# Patient Record
Sex: FEMALE | Race: WHITE | NOT HISPANIC OR LATINO | Employment: OTHER | URBAN - NONMETROPOLITAN AREA
[De-identification: names, ages, dates, MRNs, and addresses within clinical notes are randomized per-mention and may not be internally consistent; named-entity substitution may affect disease eponyms.]

---

## 2017-02-16 ENCOUNTER — OFFICE VISIT (OUTPATIENT)
Dept: OPHTHALMOLOGY | Facility: CLINIC | Age: 62
End: 2017-02-16

## 2017-02-16 DIAGNOSIS — E11.9 DIABETES MELLITUS WITHOUT COMPLICATION (HCC): ICD-10-CM

## 2017-02-16 DIAGNOSIS — H35.30 MACULAR DEGENERATION: ICD-10-CM

## 2017-02-16 DIAGNOSIS — H26.9 CATARACT: Primary | ICD-10-CM

## 2017-02-16 PROCEDURE — 99213 OFFICE O/P EST LOW 20 MIN: CPT | Performed by: OPHTHALMOLOGY

## 2017-02-16 RX ORDER — LISINOPRIL AND HYDROCHLOROTHIAZIDE 25; 20 MG/1; MG/1
TABLET ORAL
Refills: 5 | COMMUNITY
Start: 2017-02-02

## 2017-02-16 NOTE — PROGRESS NOTES
Subjective   Kaylee Elliott is a 61 y.o. female.   Chief Complaint   Patient presents with   • Diabetic Eye Exam   • Macular Degeneration     mild   • Cataract       HPI     Diabetic Eye Exam   Vision is stable.  Associated symptoms include blurred vision.  Diabetes characteristics include Type 2.  Duration of years.  Blood sugar level fluctuates.           Macular Degeneration    Additional comments: mild           Cataract   In both eyes.  Associated symptoms include blurred vision.  Severity is moderate.  Onset was gradual.  Duration of years.  Frequency is constant.  Since onset it is gradually worsening.       Last edited by Gamal Garay MD on 2/16/2017 11:02 AM. (History)          Review of Systems   Eyes: Positive for visual disturbance. Negative for pain.       Objective   Visual Acuity (Snellen - Linear)      Right Left   Dist cc 20/30 -1 20/25 -2       Correction:  Glasses         Manifest Refraction      Sphere Cylinder Axis Add   Right -0.50 +0.75 170 +2.25   Left -0.50 +0.75 180 +2.25       Comments:  Va 20/30 ou            Pupils      Pupils   Right PERRL   Left PERRL           Confrontational Visual Fields     Visual Fields      Left Right   Result Full Full                  Extraocular Movement      Right Left   Result Full, Ortho Full, Ortho              Tonometry (Applanation, 11:09 AM)      Right Left   Pressure 13 13              Main Ophthalmology Exam     External Exam      Right Left    External Normal Normal      Slit Lamp Exam      Right Left    Lids/Lashes Normal Normal    Conjunctiva/Sclera White and quiet White and quiet    Cornea Clear Clear    Anterior Chamber Deep and quiet Deep and quiet    Iris Round and reactive Round and reactive    Lens 1+ Nuclear sclerosis 1+ Nuclear sclerosis    Vitreous Normal Normal      Fundus Exam      Right Left    Disc Normal Normal    Macula Drusen, Soft drusen, Age related macular degeneration, Early age related macular degeneration Drusen,  Soft drusen, Age related macular degeneration, Early age related macular degeneration    Vessels Normal Normal    Periphery Normal Normal    No BDR                Assessment/Plan   Diagnoses and all orders for this visit:    Cataract    Diabetes mellitus without complication    Macular degeneration                Return in about 1 year (around 2/16/2018) for Dilated exam.

## 2017-05-05 ENCOUNTER — OFFICE VISIT (OUTPATIENT)
Dept: OBSTETRICS AND GYNECOLOGY | Facility: CLINIC | Age: 62
End: 2017-05-05

## 2017-05-05 VITALS
BODY MASS INDEX: 31.67 KG/M2 | DIASTOLIC BLOOD PRESSURE: 80 MMHG | WEIGHT: 209 LBS | HEIGHT: 68 IN | SYSTOLIC BLOOD PRESSURE: 140 MMHG

## 2017-05-05 DIAGNOSIS — R87.610 ATYPICAL SQUAMOUS CELL CHANGES OF UNDETERMINED SIGNIFICANCE (ASCUS) ON CERVICAL CYTOLOGY WITH NEGATIVE HIGH RISK HUMAN PAPILLOMA VIRUS (HPV) TEST RESULT: Primary | ICD-10-CM

## 2017-05-05 PROCEDURE — 99213 OFFICE O/P EST LOW 20 MIN: CPT | Performed by: OBSTETRICS & GYNECOLOGY

## 2020-03-05 ENCOUNTER — TELEPHONE (OUTPATIENT)
Dept: FAMILY MEDICINE CLINIC | Facility: CLINIC | Age: 65
End: 2020-03-05

## 2021-11-29 ENCOUNTER — TRANSCRIBE ORDERS (OUTPATIENT)
Dept: PHYSICAL THERAPY | Facility: HOSPITAL | Age: 66
End: 2021-11-29

## 2021-11-29 DIAGNOSIS — M25.562 LEFT KNEE PAIN, UNSPECIFIED CHRONICITY: Primary | ICD-10-CM

## 2021-12-06 ENCOUNTER — HOSPITAL ENCOUNTER (OUTPATIENT)
Dept: PHYSICAL THERAPY | Facility: HOSPITAL | Age: 66
Setting detail: THERAPIES SERIES
Discharge: HOME | End: 2021-12-06

## 2021-12-06 DIAGNOSIS — M25.562 PAIN IN LATERAL PORTION OF LEFT KNEE: Primary | ICD-10-CM

## 2021-12-06 PROCEDURE — 97162 PT EVAL MOD COMPLEX 30 MIN: CPT | Performed by: PHYSICAL THERAPIST

## 2021-12-06 PROCEDURE — 97110 THERAPEUTIC EXERCISES: CPT | Performed by: PHYSICAL THERAPIST

## 2021-12-06 NOTE — THERAPY EVALUATION
Outpatient Physical Therapy Ortho Initial Evaluation  [facility]     Patient Name: Kaylee Elliott  : 1955  MRN: [id number]  Today's Date: 2021      Visit Date: 2021  Visit   Return to MD: RENETTA  Re-cert date: 21  Patient Active Problem List   Diagnosis   • Cataract   • Diabetes mellitus without complication (HCC)   • Macular degeneration        Past Medical History:   Diagnosis Date   • Abnormal Pap smear of cervix    • Acute maxillary sinusitis    • Arthritis    • Atrial fibrillation (HCC)    • Degeneration of macula and posterior pole of retina    • Diabetic neuropathy (HCC)    • Nuclear senile cataract    • Pain in lower limb    • Type 2 diabetes mellitus (HCC)     no bdr        Past Surgical History:   Procedure Laterality Date   • CHOLECYSTECTOMY     • INJECTION OF MEDICATION  2015    Kenalog1)   • TUBAL ABDOMINAL LIGATION         Visit Dx:     ICD-10-CM ICD-9-CM   1. Pain in lateral portion of left knee  M25.562 719.46     Medications (Admitted on 2021)       cetirizine (zyrTEC) 10 MG tablet    lisinopril-hydrochlorothiazide (PRINZIDE,ZESTORETIC) 20-25 MG per tablet    metFORMIN (GLUCOPHAGE) 1000 MG tablet    ONE TOUCH ULTRA TEST test strip    SITagliptin (JANUVIA) 100 MG tablet    Allergies: NKA     PT Ortho     Row Name 21 0800       Subjective Comments    Subjective Comments 65 yo female with 2 yr h/o progressively worsening L knee pain. X-ray evidence of L knee films showing advanced, near bone on bone narrowing of the medial compartment with bone spurring in the patellofemoral compartment. Aggravating factors: climbing steps: down>up, extensive walking, squatting. Easing factors: keeping LE elevated, blue emu cream  -BS       Precautions and Contraindications    Precautions/Limitations --  -BS    Precautions h/o afib  -BS       Subjective Pain    Able to rate subjective pain? yes  -BS    Pre-Treatment Pain Level 4  -BS    Post-Treatment Pain Level  3  -BS    Subjective Pain Comment 4-10/10 L knee  -BS       Posture/Observations    Posture/Observations Comments wearing hinged knee brace into the clinic  -BS       General ROM    GENERAL ROM COMMENTS AROM: R knee 0-120 L knee 0-115  -BS       MMT (Manual Muscle Testing)    General MMT Comments MMT:R LE 5/5 L LE 5/5  -BS       Sensation    Light Touch No apparent deficits  -BS    Additional Comments intact fine motor coordination B LE's  -BS       Flexibility    Flexibility Tested? Lower Extremity  -BS       Lower Extremity Flexibility    Hamstrings Right:; WNL; Left:; Mildly limited  -BS    Hip Flexors Bilateral:; WNL  -BS    Quadriceps Left:; Mildly limited; Right:; Moderately limited  -BS       Balance Skills Training    SLS 2 sec on L, 2 sec, 3 sec, 4 sec on R  -BS          User Key  (r) = Recorded By, (t) = Taken By, (c) = Cosigned By    Initials Name Provider Type    Ugo Stinson, PT Physical Therapist                                   PT OP Goals     Row Name 12/06/21 0851 12/06/21 0800       PT Short Term Goals    STG Date to Achieve 12/27/21  -BS --    STG 1 Patient independent with HEP  -BS --    STG 1 Progress New  -BS --    STG 2 Improve L knee flexion AROM to 120°  -BS --    STG 2 Progress New  -BS --    STG 3 Improve L quadriceps/L hamstrings flexibility to WFL  -BS --    STG 3 Progress New  -BS --    STG 4 Improve L SLS to >/=10 sec consistently  -BS --    STG 4 Progress New  -BS --    STG 5 Able to go up/down 5 stairs in the stairwell with use of handrail with minimal to no L knee pain  -BS --    STG 5 Progress New  -BS --    STG 6 Improve 30 sec sit to stand test to 13 reps without UE A  -BS --    STG 6 Progress New  -BS --       Time Calculation    PT Goal Re-Cert Due Date 12/27/21  -BS --  -BS          User Key  (r) = Recorded By, (t) = Taken By, (c) = Cosigned By    Initials Name Provider Type    Ugo Stinson, PT Physical Therapist                 PT Assessment/Plan     Row Name  12/06/21 0851          PT Assessment    Functional Limitations Performance in leisure activities; Performance in work activities; Limitation in home management  -BS     Impairments Balance; Impaired flexibility; Gait; Pain; Range of motion; Sensation  -BS     Assessment Comments 65 yo female with chronic L knee pain due to advanced L knee osteoarthritis.  -BS     Please refer to paper survey for additional self-reported information Yes  -BS     Rehab Potential Good  -BS     Patient/caregiver participated in establishment of treatment plan and goals Yes  -BS     Patient would benefit from skilled therapy intervention Yes  -BS            PT Plan    PT Frequency 2x/week  -BS     Predicted Duration of Therapy Intervention (PT) 3 weeks then TBD  -BS     Planned CPT's? PT EVAL MOD COMPLELITY: 59966; PT RE-EVAL: 59421; PT THER PROC EA 15 MIN: 88434; PT THER ACT EA 15 MIN: 53557; PT MANUAL THERAPY EA 15 MIN: 01837; PT NEUROMUSC RE-EDUCATION EA 15 MIN: 61597; PT HOT OR COLD PACK TREAT MCARE; PT ELECTRICAL STIM UNATTEND: ; PT THER SUPP EA 15 MIN  -BS     Physical Therapy Interventions (Optional Details) balance training; home exercise program; joint mobilization; manual therapy techniques; modalities; neuromuscular re-education; patient/family education; ROM (Range of Motion); stair training; strengthening; transfer training  -BS     PT Plan Comments push L knee flexibility and standing balance. SLS on Airex, HR/TR on Airex. Standing HS S and prone quad S w/ strap.  -BS           User Key  (r) = Recorded By, (t) = Taken By, (c) = Cosigned By    Initials Name Provider Type    Ugo Stinson PT Physical Therapist                   OP Exercises     Row Name 12/06/21 0800             Subjective Comments    Subjective Comments 65 yo female with 2 yr h/o progressively worsening L knee pain. X-ray evidence of L knee films showing advanced, near bone on bone narrowing of the medial compartment with bone spurring in the  "patellofemoral compartment. Aggravating factors: climbing steps: down>up, extensive walking, squatting. Easing factors: keeping LE elevated, blue emu cream  -BS              Subjective Pain    Able to rate subjective pain? yes  -BS      Pre-Treatment Pain Level 4  -BS      Post-Treatment Pain Level 3  -BS      Subjective Pain Comment 4-10/10 L knee  -BS              Exercise 1    Exercise Name 1 SL quad/hip flex S w/ towel  -BS      Sets 1 1  -BS      Reps 1 2  -BS      Time 1 30\" hold  -BS              Exercise 2    Exercise Name 2 seated B HS S  -BS      Sets 2 1  -BS      Reps 2 1  -BS      Time 2 30\" hold  -BS              Exercise 3    Exercise Name 3 30 sec STS test, no UE A  -BS      Reps 3 10 reps  -BS              Exercise 4    Exercise Name 4 seated heel slides, L  -BS      Sets 4 1  -BS      Reps 4 15  -BS            User Key  (r) = Recorded By, (t) = Taken By, (c) = Cosigned By    Initials Name Provider Type    Ugo Stinson, PT Physical Therapist                              Outcome Measure Options: Lower Extremity Functional Scale (LEFS), 30 Second Chair Stand Test  30 Second Chair Stand Test  30 Second Chair Stand Test: 10 reps w/ no UE A  Lower Extremity Functional Index  Any of your usual work, housework or school activities: Moderate difficulty  Your usual hobbies, recreational or sporting activities: Moderate difficulty  Getting into or out of the bath: No difficulty  Walking between rooms: No difficulty  Putting on your shoes or socks: No difficulty  Squatting: Quite a bit of difficulty  Lifting an object, like a bag of groceries from the floor: Moderate difficulty  Performing light activities around your home: Moderate difficulty  Performing heavy activities around your home: Moderate difficulty  Getting into or out of a car: No difficulty  Walking 2 blocks: Quite a bit of difficulty  Walking a mile: Extreme difficulty or unable to perform activity  Going up or down 10 stairs (about 1 flight " of stairs): Extreme difficulty or unable to perform activity  Standing for 1 hour: Extreme difficulty or unable to perform activity  Sitting for 1 hour: No difficulty  Running on even ground: Extreme difficulty or unable to perform activity  Running on uneven ground: Extreme difficulty or unable to perform activity  Making sharp turns while running fast: Extreme difficulty or unable to perform activity  Hopping: Extreme difficulty or unable to perform activity  Rolling over in bed: No difficulty  Total: 36      Time Calculation:     Start Time: 0851  Stop Time: 0939  Time Calculation (min): 48 min  Total Timed Code Minutes- PT: 48 minute(s)     Therapy Charges for Today     Code Description Service Date Service Provider Modifiers Qty    90179490260 HC PT EVAL MOD COMPLEXITY 3 12/6/2021 Ugo Lama, PT GP 1    47035358487 HC PT THER PROC EA 15 MIN 12/6/2021 Ugo Lama, PT GP 1          PT G-Codes  Outcome Measure Options: Lower Extremity Functional Scale (LEFS), 30 Second Chair Stand Test  Total: 36         Ugo Lama PT  12/6/2021

## 2021-12-13 ENCOUNTER — APPOINTMENT (OUTPATIENT)
Dept: PHYSICAL THERAPY | Facility: HOSPITAL | Age: 66
End: 2021-12-13

## 2021-12-17 ENCOUNTER — APPOINTMENT (OUTPATIENT)
Dept: PHYSICAL THERAPY | Facility: HOSPITAL | Age: 66
End: 2021-12-17

## 2021-12-20 ENCOUNTER — HOSPITAL ENCOUNTER (OUTPATIENT)
Dept: PHYSICAL THERAPY | Facility: HOSPITAL | Age: 66
Setting detail: THERAPIES SERIES
Discharge: HOME | End: 2021-12-20

## 2021-12-20 DIAGNOSIS — M25.562 PAIN IN LATERAL PORTION OF LEFT KNEE: Primary | ICD-10-CM

## 2021-12-20 PROCEDURE — 97110 THERAPEUTIC EXERCISES: CPT

## 2021-12-20 NOTE — THERAPY TREATMENT NOTE
Outpatient Physical Therapy Ortho Treatment Note  [facility]     Patient Name: Kaylee Elliott  : 1955  MRN: [id number]  Today's Date: 2021      Visit Date: 2021     Subjective Improvement: 0%  Attendance:  2/ (medicare-no maintenance)  Next MD Visit : TBD  Recert Date:  2021      Therapy Diagnosis:  L Knee Pain         Visit Dx:    ICD-10-CM ICD-9-CM   1. Pain in lateral portion of left knee  M25.562 719.46       Patient Active Problem List   Diagnosis   • Cataract   • Diabetes mellitus without complication (HCC)   • Macular degeneration        Past Medical History:   Diagnosis Date   • Abnormal Pap smear of cervix    • Acute maxillary sinusitis    • Arthritis    • Atrial fibrillation (HCC)    • Degeneration of macula and posterior pole of retina    • Diabetic neuropathy (HCC)    • Nuclear senile cataract    • Pain in lower limb    • Type 2 diabetes mellitus (HCC)     no bdr        Past Surgical History:   Procedure Laterality Date   • CHOLECYSTECTOMY     • INJECTION OF MEDICATION  2015    Kenalog1)   • TUBAL ABDOMINAL LIGATION          PT Ortho     Row Name 21 0800       Precautions and Contraindications    Precautions h/o afib  -KH       Subjective Pain    Post-Treatment Pain Level 3  -KH       Posture/Observations    Posture/Observations Comments wearing hinged knee brace on L knee (lateral off loading) genu varus noted B  -KH          User Key  (r) = Recorded By, (t) = Taken By, (c) = Cosigned By    Initials Name Provider Type    Janna Rodriguez PTA Physical Therapy Assistant                             PT Assessment/Plan     Row Name 21 0800          PT Assessment    Functional Limitations Performance in leisure activities; Performance in work activities; Limitation in home management  -     Impairments Balance; Impaired flexibility; Gait; Pain; Range of motion; Sensation  -     Assessment Comments increased patients HEP this date with  "strengthening exercises; did well with all and had decreased pain post treatment; Was given written instruction for all new HEP; progressing towards goals;  -KH     Rehab Potential Good  -KH     Patient/caregiver participated in establishment of treatment plan and goals Yes  -KH     Patient would benefit from skilled therapy intervention Yes  -KH            PT Plan    PT Frequency 2x/week  -KH     Predicted Duration of Therapy Intervention (PT) 3 weeks then TBD  -KH     PT Plan Comments Progress CKC in brace next visit; Airex Balance activties next  -           User Key  (r) = Recorded By, (t) = Taken By, (c) = Cosigned By    Initials Name Provider Type    Janna Rodriguez PTA Physical Therapy Assistant                   OP Exercises     Row Name 12/20/21 0800             Subjective Comments    Subjective Comments Patient reports that she had to babysit all last week secondary to her grandkids being out of school; Knee is hurting this morning; reports that her HEP is going well;  -KH              Subjective Pain    Able to rate subjective pain? yes  -KH      Pre-Treatment Pain Level 6  -KH      Post-Treatment Pain Level 3  -KH              Exercise 1    Exercise Name 1 pro ll LE strength  -KH      Time 1 10 mins  -KH      Additional Comments level 2 ;seat 10  -KH              Exercise 2    Exercise Name 2 HC/Ham stretch with strap  -KH      Sets 2 3  -KH      Time 2 30\" holds  -KH              Exercise 3    Exercise Name 3 quad sets w/ add squeeze  -KH      Sets 3 2  -KH      Reps 3 10  -KH      Time 3 5\" holds  -KH              Exercise 4    Exercise Name 4 SLR-Flexion  -KH      Sets 4 2  -KH      Reps 4 10  -KH              Exercise 5    Exercise Name 5 Clam Shells L  -KH      Sets 5 2  -KH      Reps 5 10  -KH              Exercise 6    Exercise Name 6 Reverse Clam Shells  -KH      Sets 6 2  -KH      Reps 6 10  -KH              Exercise 7    Exercise Name 7 SAQ  -KH      Sets 7 2  -KH      Reps 7 10  -KH        " "      Exercise 8    Exercise Name 8 incline stretch  -KH      Sets 8 3  -KH      Time 8 30\" holds  -KH              Exercise 9    Exercise Name 9 standing hip flexor stretch  -KH      Sets 9 3  -KH      Time 9 30\" holds  -KH            User Key  (r) = Recorded By, (t) = Taken By, (c) = Cosigned By    Initials Name Provider Type    Janna Rodriguez PTA Physical Therapy Assistant                              PT OP Goals     Row Name 12/20/21 0800          PT Short Term Goals    STG Date to Achieve 12/27/21  -KH     STG 1 Patient independent with HEP  -KH     STG 2 Improve L knee flexion AROM to 120°  -KH     STG 3 Improve L quadriceps/L hamstrings flexibility to WFL  -KH     STG 4 Improve L SLS to >/=10 sec consistently  -KH     STG 5 Able to go up/down 5 stairs in the stairwell with use of handrail with minimal to no L knee pain  -KH     STG 6 Improve 30 sec sit to stand test to 13 reps without UE A  -KH            Time Calculation    PT Goal Re-Cert Due Date 12/27/21  -           User Key  (r) = Recorded By, (t) = Taken By, (c) = Cosigned By    Initials Name Provider Type    Janna Rodriguez PTA Physical Therapy Assistant                Therapy Education  Education Details: clam shells, reverse clam shells, SLR flexion, SAQ & quad sets w/ add squeeze  Given: HEP  Program: New  How Provided: Verbal, Demonstration, Written  Provided to: Patient  Level of Understanding: Teach back education performed, Verbalized, Demonstrated              Time Calculation:   Start Time: 0800  Stop Time: 0838  Time Calculation (min): 38 min  Therapy Charges for Today     Code Description Service Date Service Provider Modifiers Qty    40548942668  PT THER PROC EA 15 MIN 12/20/2021 Janna Khan PTA GP 3                    Janna Khan PTA  12/20/2021     "

## 2021-12-22 ENCOUNTER — APPOINTMENT (OUTPATIENT)
Dept: PHYSICAL THERAPY | Facility: HOSPITAL | Age: 66
End: 2021-12-22

## 2021-12-27 ENCOUNTER — APPOINTMENT (OUTPATIENT)
Dept: PHYSICAL THERAPY | Facility: HOSPITAL | Age: 66
End: 2021-12-27

## 2021-12-29 ENCOUNTER — HOSPITAL ENCOUNTER (OUTPATIENT)
Dept: PHYSICAL THERAPY | Facility: HOSPITAL | Age: 66
Setting detail: THERAPIES SERIES
Discharge: HOME | End: 2021-12-29

## 2021-12-29 DIAGNOSIS — M25.562 PAIN IN LATERAL PORTION OF LEFT KNEE: Primary | ICD-10-CM

## 2021-12-29 PROCEDURE — 97110 THERAPEUTIC EXERCISES: CPT | Performed by: PHYSICAL THERAPIST

## 2021-12-29 NOTE — THERAPY PROGRESS REPORT/RE-CERT
Outpatient Physical Therapy Ortho Progress Note  [facility]     Patient Name: Kaylee Elliott  : 1955  MRN: [id number]  Today's Date: 2021      Visit Date: 2021  Subjective Improvement: 0%  Attendance:  3/5 (medicare-no maintenance)  Next MD Visit : TBD  Recert Date:  2022        Therapy Diagnosis:  L Knee Pain   Visit Dx:    ICD-10-CM ICD-9-CM   1. Pain in lateral portion of left knee  M25.562 719.46       Patient Active Problem List   Diagnosis   • Cataract   • Diabetes mellitus without complication (HCC)   • Macular degeneration        Past Medical History:   Diagnosis Date   • Abnormal Pap smear of cervix    • Acute maxillary sinusitis    • Arthritis    • Atrial fibrillation (HCC)    • Degeneration of macula and posterior pole of retina    • Diabetic neuropathy (HCC)    • Nuclear senile cataract    • Pain in lower limb    • Type 2 diabetes mellitus (HCC)     no bdr        Past Surgical History:   Procedure Laterality Date   • CHOLECYSTECTOMY     • INJECTION OF MEDICATION  2015    Kenalog1)   • TUBAL ABDOMINAL LIGATION          PT Ortho     Row Name 21 0800       Precautions and Contraindications    Precautions h/o afib  -BS       Subjective Pain    Post-Treatment Pain Level 2  -BS       Posture/Observations    Posture/Observations Comments wearing hinged knee brace on L knee (lateral off loading) genu varus noted B  -BS       General ROM    GENERAL ROM COMMENTS AROM: L knee 0-129°  -BS       MMT (Manual Muscle Testing)    General MMT Comments MMT: L knee 5/5 (flex/ext) L ankle margret/inv 4+/5 (both)  -BS       Lower Extremity Flexibility    Hamstrings Left:; WNL  L 70°  -BS       Balance Skills Training    SLS SLS 3 sec left  -BS          User Key  (r) = Recorded By, (t) = Taken By, (c) = Cosigned By    Initials Name Provider Type    Ugo Stinson PT Physical Therapist                             PT Assessment/Plan     Row Name 21 0800          PT  "Assessment    Assessment Comments Slow progress toward goals in part due to poor participation with PT over the past cert period. Pt educated on need to commit to 2x/week per plan to maximize functional potential.  -BS     Rehab Potential Good  -BS     Patient/caregiver participated in establishment of treatment plan and goals Yes  -BS     Patient would benefit from skilled therapy intervention Yes  -BS            PT Plan    PT Frequency 2x/week  -BS     Predicted Duration of Therapy Intervention (PT) 3 weeks  -BS     PT Plan Comments continue step ups/downs on 4\" without brace, TKE with TB.  -BS           User Key  (r) = Recorded By, (t) = Taken By, (c) = Cosigned By    Initials Name Provider Type    BS Ugo Lama, PT Physical Therapist                   OP Exercises     Row Name 12/29/21 0800             Subjective Comments    Subjective Comments Patient reports had a sinus infection last week and unable to attend as well due to babysitting her grandchildren this current cert period.  -BS              Subjective Pain    Able to rate subjective pain? yes  -BS      Pre-Treatment Pain Level 2  -BS      Post-Treatment Pain Level 2  -BS              Exercise 1    Exercise Name 1 pro ll LE strength  -BS      Time 1 10 mins  -BS              Exercise 2    Exercise Name 2 standing L HS S  -BS      Sets 2 1  -BS      Reps 2 3  -BS      Time 2 30\" hold  -BS              Exercise 3    Exercise Name 3 30 sec STS test  -BS      Time 3 11 reps w/ no UE A  -BS              Exercise 4    Exercise Name 4 R SLS  -BS      Reps 4 3 sec, multiple trials  -BS              Exercise 5    Exercise Name 5 Sharpened Romberg  -BS      Time 5 17 sec with each LE leading  -BS              Exercise 6    Exercise Name 6 standing HR/TR  -BS      Sets 6 1  -BS      Reps 6 10  -BS              Exercise 7    Exercise Name 7 up/down 2-3 portable steps w/ no use of HR  -BS      Reps 7 2 reps  -BS      Additional Comments 3/10 L knee pain with " climbing stairs  -BS            User Key  (r) = Recorded By, (t) = Taken By, (c) = Cosigned By    Initials Name Provider Type    Ugo Stinson, PT Physical Therapist                              PT OP Goals     Row Name 12/29/21 0800          PT Short Term Goals    STG Date to Achieve 01/19/22  -BS     STG 1 Patient independent with HEP  -BS     STG 1 Progress Ongoing  -BS     STG 2 Improve L knee flexion AROM to 120°  -BS     STG 2 Progress Ongoing  -BS     STG 3 Improve L quadriceps/L hamstrings flexibility to WFL  -BS     STG 3 Progress Ongoing  -BS     STG 4 Improve L SLS to >/=10 sec consistently  -BS     STG 4 Progress Ongoing  -BS     STG 5 Able to go up/down 5 stairs in the stairwell with use of handrail with minimal to no L knee pain  -BS     STG 5 Progress Ongoing  -BS     STG 6 Improve 30 sec sit to stand test to 13 reps without UE A  -BS     STG 6 Progress Ongoing  -BS            Time Calculation    PT Goal Re-Cert Due Date 01/19/22  -BS           User Key  (r) = Recorded By, (t) = Taken By, (c) = Cosigned By    Initials Name Provider Type    Ugo Stinson, PT Physical Therapist                     Outcome Measure Options: Lower Extremity Functional Scale (LEFS), 30 Second Chair Stand Test  30 Second Chair Stand Test  30 Second Chair Stand Test: 11 reps w/ no UEA         Time Calculation:   Start Time: 0800  Stop Time: 0849  Time Calculation (min): 49 min  Total Timed Code Minutes- PT: 49 minute(s)  Therapy Charges for Today     Code Description Service Date Service Provider Modifiers Qty    62801373824 HC PT THER PROC EA 15 MIN 12/29/2021 Ugo Lama, PT GP 3          PT G-Codes  Outcome Measure Options: Lower Extremity Functional Scale (LEFS), 30 Second Chair Stand Test         Ugo Lama, PT  12/29/2021

## 2022-01-03 ENCOUNTER — HOSPITAL ENCOUNTER (OUTPATIENT)
Dept: PHYSICAL THERAPY | Facility: HOSPITAL | Age: 67
Setting detail: THERAPIES SERIES
Discharge: HOME | End: 2022-01-03

## 2022-01-03 DIAGNOSIS — M25.562 PAIN IN LATERAL PORTION OF LEFT KNEE: Primary | ICD-10-CM

## 2022-01-03 PROCEDURE — 97110 THERAPEUTIC EXERCISES: CPT

## 2022-01-03 NOTE — THERAPY TREATMENT NOTE
Outpatient Physical Therapy Ortho Treatment Note  [facility]     Patient Name: Kaylee Elliott  : 1955  MRN: [id number]  Today's Date: 1/3/2022      Visit Date: 2022     Subjective Improvement: 0%  Attendance:   (medicare-no maintenance)  Next MD Visit : 2022  Recert Date:  2022        Therapy Diagnosis:  L Knee Pain          Visit Dx:    ICD-10-CM ICD-9-CM   1. Pain in lateral portion of left knee  M25.562 719.46       Patient Active Problem List   Diagnosis   • Cataract   • Diabetes mellitus without complication (HCC)   • Macular degeneration        Past Medical History:   Diagnosis Date   • Abnormal Pap smear of cervix    • Acute maxillary sinusitis    • Arthritis    • Atrial fibrillation (HCC)    • Degeneration of macula and posterior pole of retina    • Diabetic neuropathy (HCC)    • Nuclear senile cataract    • Pain in lower limb    • Type 2 diabetes mellitus (HCC)     no bdr        Past Surgical History:   Procedure Laterality Date   • CHOLECYSTECTOMY     • INJECTION OF MEDICATION  2015    Kenalog1)   • TUBAL ABDOMINAL LIGATION          PT Ortho     Row Name 22 0800       Subjective Comments    Subjective Comments Hurting a little more today secondary to the weather change and being cold  -       Precautions and Contraindications    Precautions h/o afib  -KH       Posture/Observations    Posture/Observations Comments wearing hinged knee brace on L knee (lateral off loading) genu varus noted B  -          User Key  (r) = Recorded By, (t) = Taken By, (c) = Cosigned By    Initials Name Provider Type     Janna Khan PTA Physical Therapy Assistant                             PT Assessment/Plan     Row Name 22 08          PT Assessment    Functional Limitations Performance in leisure activities; Performance in work activities; Limitation in home management  -     Impairments Balance; Impaired flexibility; Gait; Pain; Range of motion; Sensation   "-KH     Assessment Comments progressed with WB/CKC on L knee and hip this date; did well with slight increased pain with step ups; But all exercises done without brace; No chnage in HEP at this time;  -KH     Rehab Potential Good  -KH     Patient/caregiver participated in establishment of treatment plan and goals Yes  -KH     Patient would benefit from skilled therapy intervention Yes  -KH            PT Plan    PT Frequency 2x/week  -KH     Predicted Duration of Therapy Intervention (PT) 3 weeks  -KH     PT Plan Comments Progress balance with airex next visit; Contiuning to push strength and ROM as able in the L knee;  -           User Key  (r) = Recorded By, (t) = Taken By, (c) = Cosigned By    Initials Name Provider Type    Janna Rodriguez PTA Physical Therapy Assistant                   OP Exercises     Row Name 01/03/22 0800             Subjective Comments    Subjective Comments Hurting a little more today secondary to the weather change and being cold  -              Subjective Pain    Able to rate subjective pain? yes  -KH      Pre-Treatment Pain Level 5  -KH      Post-Treatment Pain Level 5  -KH              Exercise 1    Exercise Name 1 pro ll LE strength  -      Time 1 10 mins  -KH      Additional Comments level 2; seat 10  -KH              Exercise 2    Exercise Name 2 standing hamstring stretch  -KH      Sets 2 3  -KH      Time 2 30\" holds  -KH              Exercise 3    Exercise Name 3 incline stretch  -KH      Sets 3 3  -KH      Time 3 30\" holds  -KH              Exercise 4    Exercise Name 4 step ups L only (no brace)  -KH      Sets 4 2  -KH      Reps 4 10  -KH      Additional Comments 4\" aerobic step  -KH              Exercise 5    Exercise Name 5 step ups lateral L only (no brace)  -KH      Sets 5 2  -KH      Reps 5 10  -KH      Additional Comments 4\" aerobic step  -KH              Exercise 6    Exercise Name 6 LAQ w/ add squeeze  -KH      Sets 6 3  -KH      Reps 6 10  -KH      Time 6 2\" holds  " "-KH              Exercise 7    Exercise Name 7 standing TKEs L only  -KH      Sets 7 2  -KH      Reps 7 10  -KH      Time 7 3\" holds  -KH      Additional Comments green band  -KH              Exercise 8    Exercise Name 8 stanidng hip abd B  -KH      Sets 8 2  -KH      Reps 8 10  -KH      Additional Comments green band at thighs  -KH              Exercise 9    Exercise Name 9 standing hip ext B  -KH      Sets 9 2  -KH      Reps 9 10  -KH      Additional Comments green band at thighs  -KH              Exercise 10    Exercise Name 10 sit to stands  -KH      Sets 10 2  -KH      Reps 10 10  -KH      Additional Comments no UE assist  -KH            User Key  (r) = Recorded By, (t) = Taken By, (c) = Cosigned By    Initials Name Provider Type    Janna Rodriguez PTA Physical Therapy Assistant                              PT OP Goals     Row Name 01/03/22 0800          PT Short Term Goals    STG Date to Achieve 01/19/22  -     STG 1 Patient independent with HEP  -KH     STG 1 Progress Ongoing  -KH     STG 2 Improve L knee flexion AROM to 120°  -KH     STG 2 Progress Ongoing  -KH     STG 3 Improve L quadriceps/L hamstrings flexibility to WFL  -KH     STG 3 Progress Ongoing  -KH     STG 4 Improve L SLS to >/=10 sec consistently  -KH     STG 4 Progress Ongoing  -KH     STG 5 Able to go up/down 5 stairs in the stairwell with use of handrail with minimal to no L knee pain  -KH     STG 5 Progress Ongoing  -KH     STG 6 Improve 30 sec sit to stand test to 13 reps without UE A  -KH     STG 6 Progress Ongoing  -KH            Time Calculation    PT Goal Re-Cert Due Date 01/19/22  -           User Key  (r) = Recorded By, (t) = Taken By, (c) = Cosigned By    Initials Name Provider Type    Janna Rodriguez PTA Physical Therapy Assistant                               Time Calculation:   Start Time: 0802  Stop Time: 0840  Time Calculation (min): 38 min  Therapy Charges for Today     Code Description Service Date Service Provider " Modifiers Qty    [id number]  PT THER PROC EA 15 MIN 1/3/2022 Janna Khan, PTA GP 3                    Janna Khan PTA  1/3/2022

## 2022-01-10 ENCOUNTER — APPOINTMENT (OUTPATIENT)
Dept: PHYSICAL THERAPY | Facility: HOSPITAL | Age: 67
End: 2022-01-10

## 2022-01-13 ENCOUNTER — HOSPITAL ENCOUNTER (OUTPATIENT)
Dept: PHYSICAL THERAPY | Facility: HOSPITAL | Age: 67
Setting detail: THERAPIES SERIES
Discharge: HOME | End: 2022-01-13

## 2022-01-13 DIAGNOSIS — M25.562 PAIN IN LATERAL PORTION OF LEFT KNEE: Primary | ICD-10-CM

## 2022-01-13 PROCEDURE — 97110 THERAPEUTIC EXERCISES: CPT

## 2022-01-17 ENCOUNTER — HOSPITAL ENCOUNTER (OUTPATIENT)
Dept: PHYSICAL THERAPY | Facility: HOSPITAL | Age: 67
Setting detail: THERAPIES SERIES
Discharge: HOME | End: 2022-01-17

## 2022-01-17 DIAGNOSIS — M25.562 PAIN IN LATERAL PORTION OF LEFT KNEE: Primary | ICD-10-CM

## 2022-01-17 PROCEDURE — 97110 THERAPEUTIC EXERCISES: CPT | Performed by: PHYSICAL THERAPIST

## 2022-01-17 NOTE — THERAPY DISCHARGE NOTE
"     Outpatient Physical Therapy Ortho Treatment Note/Discharge Summary  [facility]     Patient Name: Kaylee Elliott  : 1955  MRN: [id number]  Today's Date: 2022      Visit Date: 2022    Attendance:   Subjective Improvement: \"not really\"  Next MD Appt: 22    Therapy Diagnosis: L knee pain    Visit Dx:    ICD-10-CM ICD-9-CM   1. Pain in lateral portion of left knee  M25.562 719.46            Past Medical History:   Diagnosis Date   • Abnormal Pap smear of cervix    • Acute maxillary sinusitis    • Arthritis    • Atrial fibrillation (HCC)    • Degeneration of macula and posterior pole of retina    • Diabetic neuropathy (HCC)    • Nuclear senile cataract    • Pain in lower limb    • Type 2 diabetes mellitus (HCC)     no bdr        Past Surgical History:   Procedure Laterality Date   • CHOLECYSTECTOMY     • INJECTION OF MEDICATION  2015    Kenalog1)   • TUBAL ABDOMINAL LIGATION          PT Ortho     Row Name 22 0800       Subjective Comments    Subjective Comments Knee is \"hurting.\" \"Not really\" improving with therapy. Pain medial L knee. Reports working on HEP. Reports on her feet a lot this weekend.  -SS       Precautions and Contraindications    Precautions h/o afib  -SS       Subjective Pain    Able to rate subjective pain? yes  -SS    Pre-Treatment Pain Level 5  -SS       Posture/Observations    Posture/Observations Comments Presents this date wearing hinged off-loading knee brace. Stands and ambulates with wide base of support. Antalgic gait.  -SS       General ROM    GENERAL ROM COMMENTS AROM L knee: 0-131 deg  -SS       MMT (Manual Muscle Testing)    General MMT Comments MMT L LE: hip and knee 5/5 each  -SS       Lower Extremity Flexibility    Hamstrings Right:; WNL  -SS    Quadriceps Bilateral:; WNL  -SS       Balance Skills Training    SLS B 3\"  -SS       Gait/Stairs (Locomotion)    [place] Level (Stairs) independent  -SS    Handrail Location (Stairs) " "right side (ascending); left side (descending)  -SS    Number of Steps (Stairs) 5  -SS    Ascending Technique (Stairs) step-over-step  -SS    Descending Technique (Stairs) step-to-step  -SS    Comment (Gait/Stairs) Knee pain worse after stairs.  -SS          User Key  (r) = Recorded By, (t) = Taken By, (c) = Cosigned By    Initials Name Provider Type     Eliezer Awan, PT DPT Physical Therapist                             PT Assessment/Plan     Row Name 01/17/22 0800          PT Assessment    Functional Limitations Performance in leisure activities; Performance in work activities; Limitation in home management  -SS     Impairments Balance; Gait; Pain  -SS     Assessment Comments Patient continues to have knee pain. She has chronic balance deficits that are minimally improved with P.T. She would benefit from an independent aquatic program to continue to work on strength. Further therapy not indicated at present.  -SS     Rehab Potential Good  -SS     Patient/caregiver participated in establishment of treatment plan and goals Yes  -SS     Patient would benefit from skilled therapy intervention No  -SS            PT Plan    PT Plan Comments D/C P.T. to HEP and independent aquatic fitness program.  -SS           User Key  (r) = Recorded By, (t) = Taken By, (c) = Cosigned By    Initials Name Provider Type     Eliezer Awan, PT DPT Physical Therapist                     OP Exercises     Row Name 01/17/22 0800             Subjective Comments    Subjective Comments Knee is \"hurting.\" \"Not really\" improving with therapy. Pain medial L knee. Reports working on HEP. Reports on her feet a lot this weekend.  -SS              Subjective Pain    Able to rate subjective pain? yes  -SS      Pre-Treatment Pain Level 5  -SS      Post-Treatment Pain Level 2  -SS              Exercise 1    Exercise Name 1 Pro2, Seat 10, LE, strength/endurance  -SS      Cueing 1 Verbal  -SS      Time 1 10 mins  -SS      Additional " Comments Level 3.5  -SS              Exercise 2    Exercise Name 2 recheck  -            User Key  (r) = Recorded By, (t) = Taken By, (c) = Cosigned By    Initials Name Provider Type    Eliezer Ibrahim, PT DPT Physical Therapist                                PT OP Goals     Row Name 01/17/22 0800          PT Short Term Goals    STG Date to Achieve 01/19/22  -     STG 1 Patient independent with HEP  -SS     STG 1 Progress Met  -SS     STG 2 Improve L knee flexion AROM to 120°  -SS     STG 2 Progress Met  -SS     STG 3 Improve L quadriceps/L hamstrings flexibility to WFL  -SS     STG 3 Progress Met  -SS     STG 4 Improve L SLS to >/=10 sec consistently  -SS     STG 4 Progress Met  -SS     STG 5 Able to go up/down 5 stairs in the stairwell with use of handrail with minimal to no L knee pain  -SS     STG 5 Progress Not Met  -SS     STG 6 Improve 30 sec sit to stand test to 13 reps without UE A  -     STG 6 Progress Met  -            Time Calculation    PT Goal Re-Cert Due Date 01/19/22  -           User Key  (r) = Recorded By, (t) = Taken By, (c) = Cosigned By    Initials Name Provider Type    Eliezer Ibrahim, PT DPT Physical Therapist                Therapy Education  Education Details: encouraged independent aquatic fitness program; reinforced HEP completion  How Provided: Verbal  Provided to: Patient  Level of Understanding: Verbalized    Outcome Measure Options: 30 Second Chair Stand Test  30 Second Chair Stand Test  30 Second Chair Stand Test: 13 without UE assist  Lower Extremity Functional Index  Any of your usual work, housework or school activities: A little bit of difficulty  Your usual hobbies, recreational or sporting activities: Moderate difficulty  Getting into or out of the bath: No difficulty  Walking between rooms: No difficulty  Putting on your shoes or socks: No difficulty  Squatting: A little bit of difficulty  Lifting an object, like a bag of groceries from the floor:  No difficulty  Performing light activities around your home: A little bit of difficulty  Performing heavy activities around your home: Quite a bit of difficulty  Getting into or out of a car: No difficulty  Walking 2 blocks: A little bit of difficulty  Walking a mile: Quite a bit of difficulty  Going up or down 10 stairs (about 1 flight of stairs): Moderate difficulty  Standing for 1 hour: Extreme difficulty or unable to perform activity  Sitting for 1 hour: No difficulty  Running on even ground: Extreme difficulty or unable to perform activity  Running on uneven ground: Extreme difficulty or unable to perform activity  Making sharp turns while running fast: Extreme difficulty or unable to perform activity  Hopping: Extreme difficulty or unable to perform activity  Rolling over in bed: No difficulty  Total: 46      Time Calculation:   Start Time: 0807  Stop Time: 0839  Time Calculation (min): 32 min  Total Timed Code Minutes- PT: 32 minute(s)  Therapy Charges for Today     Code Description Service Date Service Provider Modifiers Qty    [id number] HC PT THER PROC EA 15 MIN 1/17/2022 lEiezer Awan, PT DPT GP 2               OP PT Discharge Summary  Date of Discharge: 01/17/22  Reason for Discharge: Maximum functional potential achieved  Outcomes Achieved: Patient able to partially achieve established goals (Achieved 5 of 6 goals.)  Discharge Destination: Home with home program      Eliezer Awan, PT, DPT, CHT  1/17/2022

## 2022-01-19 ENCOUNTER — APPOINTMENT (OUTPATIENT)
Dept: PHYSICAL THERAPY | Facility: HOSPITAL | Age: 67
End: 2022-01-19

## 2022-01-24 ENCOUNTER — APPOINTMENT (OUTPATIENT)
Dept: PHYSICAL THERAPY | Facility: HOSPITAL | Age: 67
End: 2022-01-24

## 2022-01-26 ENCOUNTER — APPOINTMENT (OUTPATIENT)
Dept: PHYSICAL THERAPY | Facility: HOSPITAL | Age: 67
End: 2022-01-26

## 2023-06-05 ENCOUNTER — TRANSCRIBE ORDERS (OUTPATIENT)
Dept: PODIATRY | Facility: CLINIC | Age: 68
End: 2023-06-05
Payer: MEDICARE

## 2023-06-05 DIAGNOSIS — E11.9 ENCOUNTER FOR DIABETIC FOOT EXAM: Primary | ICD-10-CM

## 2023-06-16 ENCOUNTER — OFFICE VISIT (OUTPATIENT)
Dept: PODIATRY | Facility: CLINIC | Age: 68
End: 2023-06-16
Payer: MEDICARE

## 2023-06-16 VITALS — WEIGHT: 209 LBS | OXYGEN SATURATION: 97 % | HEART RATE: 60 BPM | HEIGHT: 68 IN | BODY MASS INDEX: 31.67 KG/M2

## 2023-06-16 DIAGNOSIS — E11.649 TYPE 2 DIABETES MELLITUS WITH HYPOGLYCEMIA WITHOUT COMA, WITHOUT LONG-TERM CURRENT USE OF INSULIN: ICD-10-CM

## 2023-06-16 DIAGNOSIS — M79.672 BILATERAL FOOT PAIN: ICD-10-CM

## 2023-06-16 DIAGNOSIS — M79.2 POLYNEUROPATHIC PAIN: ICD-10-CM

## 2023-06-16 DIAGNOSIS — B35.1 ONYCHOMYCOSIS: ICD-10-CM

## 2023-06-16 DIAGNOSIS — M79.671 BILATERAL FOOT PAIN: ICD-10-CM

## 2023-06-16 DIAGNOSIS — E11.8 DIABETIC FOOT: Primary | ICD-10-CM

## 2023-06-16 RX ORDER — ESCITALOPRAM OXALATE 20 MG/1
20 TABLET ORAL DAILY
Qty: 30 TABLET | Refills: 30 | COMMUNITY
Start: 2021-04-29 | End: 2023-11-01

## 2023-06-16 RX ORDER — GABAPENTIN 100 MG/1
100 CAPSULE ORAL 3 TIMES DAILY
Qty: 90 CAPSULE | Refills: 0 | Status: SHIPPED | OUTPATIENT
Start: 2023-06-16

## 2023-06-16 RX ORDER — LISINOPRIL 20 MG/1
20 TABLET ORAL DAILY
COMMUNITY

## 2023-06-16 RX ORDER — METOPROLOL SUCCINATE 50 MG/1
50 TABLET, EXTENDED RELEASE ORAL DAILY
Qty: 30 TABLET | Refills: 11 | COMMUNITY
Start: 2022-12-07 | End: 2023-12-08

## 2023-06-16 RX ORDER — INSULIN GLARGINE 100 [IU]/ML
INJECTION, SOLUTION SUBCUTANEOUS
COMMUNITY

## 2023-06-16 NOTE — PROGRESS NOTES
Kaylee Elliott  1955  68 y.o. female  PCP: Uziel Rivera: 05/16/2023  BS: 144 per pt       06/16/2023    Chief Complaint   Patient presents with   • Left Foot - Pain   • Right Foot - Pain       History of Present Illness    Kaylee Elliott is a 68 y.o.female presents to the clinic today for bilateral foot pain. Patient  describes her pain as burning, tingling, and numbness all over.       Past Medical History:   Diagnosis Date   • Abnormal Pap smear of cervix    • Acute maxillary sinusitis    • Arthritis    • Atrial fibrillation    • Degeneration of macula and posterior pole of retina    • Diabetic neuropathy    • Nuclear senile cataract    • Pain in lower limb    • Type 2 diabetes mellitus     no bdr         Past Surgical History:   Procedure Laterality Date   • CHOLECYSTECTOMY     • INJECTION OF MEDICATION  05/25/2015    Kenalog1)   • TUBAL ABDOMINAL LIGATION           Family History   Problem Relation Age of Onset   • Cataracts Other    • Melanoma Father    • Breast cancer Neg Hx    • Ovarian cancer Neg Hx    • Uterine cancer Neg Hx    • Colon cancer Neg Hx        No Known Allergies    Social History     Socioeconomic History   • Marital status:    Tobacco Use   • Smoking status: Never   Substance and Sexual Activity   • Alcohol use: No   • Drug use: No   • Sexual activity: Defer         Current Outpatient Medications   Medication Sig Dispense Refill   • escitalopram (LEXAPRO) 20 MG tablet Take 1 tablet by mouth Daily. 30 tablet 30   • Lantus SoloStar 100 UNIT/ML injection pen Lantus Solostar U-100 Insulin 100 unit/mL (3 mL) subcutaneous pen     • lisinopril (PRINIVIL,ZESTRIL) 20 MG tablet Take 1 tablet by mouth Daily.     • metFORMIN (GLUCOPHAGE) 1000 MG tablet TAKE 1 TABLET BY MOUTH TWICE A DAY  5   • metoprolol succinate XL (TOPROL-XL) 50 MG 24 hr tablet Take 1 tablet by mouth Daily. 30 tablet 11   • ONE TOUCH ULTRA TEST test strip TEST TWICE DAILY  5   • rivaroxaban (XARELTO) 20 MG  "tablet Take 1 tablet by mouth.     • SITagliptin (JANUVIA) 100 MG tablet Take 1 tablet by mouth Daily.     • gabapentin (NEURONTIN) 100 MG capsule Take 1 capsule by mouth 3 (Three) Times a Day. 90 capsule 0     No current facility-administered medications for this visit.       Review of Systems   Constitutional: Negative.    HENT: Negative.     Eyes: Negative.    Respiratory: Negative.     Cardiovascular: Negative.    Gastrointestinal: Negative.    Endocrine: Negative.    Genitourinary: Negative.    Musculoskeletal:         Foot pain    Skin: Negative.    Allergic/Immunologic: Negative.    Neurological: Negative.    Hematological: Negative.    Psychiatric/Behavioral: Negative.         OBJECTIVE    Pulse 60   Ht 172.7 cm (68\")   Wt 94.8 kg (209 lb)   LMP 11/22/2006 (Approximate) Comment: menopause  SpO2 97%   BMI 31.78 kg/m²     Body mass index is 31.78 kg/m².        Physical Exam  Vitals reviewed.   Constitutional:       Appearance: Normal appearance. She is well-developed.   HENT:      Head: Normocephalic and atraumatic.   Neck:      Trachea: Trachea and phonation normal.   Cardiovascular:      Pulses:           Dorsalis pedis pulses are 1+ on the right side and 1+ on the left side.        Posterior tibial pulses are 1+ on the right side and 1+ on the left side.   Pulmonary:      Effort: Pulmonary effort is normal. No respiratory distress.   Abdominal:      General: There is no distension.      Palpations: Abdomen is soft.   Feet:      Right foot:      Skin integrity: Skin integrity normal.      Toenail Condition: Right toenails are abnormally thick and long. Fungal disease present.     Left foot:      Skin integrity: Skin integrity normal.      Toenail Condition: Left toenails are abnormally thick and long. Fungal disease present.  Skin:     General: Skin is warm and dry.   Neurological:      Mental Status: She is alert and oriented to person, place, and time.      GCS: GCS eye subscore is 4. GCS verbal " subscore is 5. GCS motor subscore is 6.   Psychiatric:         Speech: Speech normal.         Behavior: Behavior normal. Behavior is cooperative.         Thought Content: Thought content normal.         Judgment: Judgment normal.                Procedures        ASSESSMENT AND PLAN    Diagnoses and all orders for this visit:    1. Diabetic foot (Primary)    2. Bilateral foot pain  -     XR foot 3+ vw bilateral  -     gabapentin (NEURONTIN) 100 MG capsule; Take 1 capsule by mouth 3 (Three) Times a Day.  Dispense: 90 capsule; Refill: 0    3. Type 2 diabetes mellitus with hypoglycemia without coma, without long-term current use of insulin    4. Polyneuropathic pain    5. Onychomycosis      Body mass index is 31.78 kg/m².    Recommend follow-up with primary care to discuss BMI greater than 30    - A diabetic foot screening exam was performed and the patient was educated on the foot complications related to diabetes,  preventative foot care and what signs and symptoms to watch for.  Instructed to contact our office if any foot problems develop before next visit.  -Discussed treatments for  painful toenails. Treatment options discussed included nail removal versus debridement. Patient elected for routine nail debridement. An ABN has been signed by the patient.  - Toenails 1-5 bilateral were debrided in length and thickness with nail nipper and electric  to decrease fungal load and risk of infection.  - All the patients questions were answered.  - RTC 3 months or sooner if needed.           This document has been electronically signed by John CHARLES, FNP-C, ONP-C on June 16, 2023 11:15 CDT

## 2023-09-18 ENCOUNTER — OFFICE VISIT (OUTPATIENT)
Dept: PODIATRY | Facility: CLINIC | Age: 68
End: 2023-09-18
Payer: MEDICARE

## 2023-09-18 VITALS
SYSTOLIC BLOOD PRESSURE: 170 MMHG | WEIGHT: 226 LBS | BODY MASS INDEX: 34.25 KG/M2 | HEIGHT: 68 IN | HEART RATE: 116 BPM | DIASTOLIC BLOOD PRESSURE: 70 MMHG | OXYGEN SATURATION: 94 %

## 2023-09-18 DIAGNOSIS — B35.1 ONYCHOMYCOSIS: ICD-10-CM

## 2023-09-18 DIAGNOSIS — M79.672 BILATERAL FOOT PAIN: ICD-10-CM

## 2023-09-18 DIAGNOSIS — M79.671 BILATERAL FOOT PAIN: ICD-10-CM

## 2023-09-18 DIAGNOSIS — E11.8 DIABETIC FOOT: Primary | ICD-10-CM

## 2023-09-18 DIAGNOSIS — M79.2 POLYNEUROPATHIC PAIN: ICD-10-CM

## 2023-09-18 PROCEDURE — 99213 OFFICE O/P EST LOW 20 MIN: CPT | Performed by: NURSE PRACTITIONER

## 2023-09-18 RX ORDER — GABAPENTIN 100 MG/1
100 CAPSULE ORAL 3 TIMES DAILY
Qty: 90 CAPSULE | Refills: 2 | Status: SHIPPED | OUTPATIENT
Start: 2023-09-18

## 2023-09-18 NOTE — PROGRESS NOTES
Kaylee Elliott  1955  68 y.o. female  PCP: Uziel Rivera: 09/14/2023  BS: 168 per pt     09/18/2023    Chief Complaint   Patient presents with    Left Foot - Follow-up     Diabetic nail debridement     Right Foot - Follow-up     Diabetic nail debridement        History of Present Illness    Kaylee Elliott is a 68 y.o.female presents to the clinic today for diabetic nail debridement. Patient also would like a script for diabetic shoes.       Past Medical History:   Diagnosis Date    Abnormal Pap smear of cervix     Acute maxillary sinusitis     Arthritis     Atrial fibrillation     Degeneration of macula and posterior pole of retina     Diabetic neuropathy     Nuclear senile cataract     Pain in lower limb     Type 2 diabetes mellitus     no bdr         Past Surgical History:   Procedure Laterality Date    CHOLECYSTECTOMY      INJECTION OF MEDICATION  05/25/2015    Kenalog1)    TUBAL ABDOMINAL LIGATION           Family History   Problem Relation Age of Onset    Cataracts Other     Melanoma Father     Breast cancer Neg Hx     Ovarian cancer Neg Hx     Uterine cancer Neg Hx     Colon cancer Neg Hx        No Known Allergies    Social History     Socioeconomic History    Marital status:    Tobacco Use    Smoking status: Never   Substance and Sexual Activity    Alcohol use: No    Drug use: No    Sexual activity: Defer         Current Outpatient Medications   Medication Sig Dispense Refill    escitalopram (LEXAPRO) 20 MG tablet Take 1 tablet by mouth Daily. 30 tablet 30    gabapentin (NEURONTIN) 100 MG capsule Take 1 capsule by mouth 3 (Three) Times a Day. 90 capsule 0    Lantus SoloStar 100 UNIT/ML injection pen Lantus Solostar U-100 Insulin 100 unit/mL (3 mL) subcutaneous pen      lisinopril (PRINIVIL,ZESTRIL) 20 MG tablet Take 1 tablet by mouth Daily.      metFORMIN (GLUCOPHAGE) 1000 MG tablet TAKE 1 TABLET BY MOUTH TWICE A DAY  5    metoprolol succinate XL (TOPROL-XL) 50 MG 24 hr tablet Take 1  "tablet by mouth Daily. 30 tablet 11    ONE TOUCH ULTRA TEST test strip TEST TWICE DAILY  5    rivaroxaban (XARELTO) 20 MG tablet Take 1 tablet by mouth.      SITagliptin (JANUVIA) 100 MG tablet Take 1 tablet by mouth Daily.       No current facility-administered medications for this visit.       Review of Systems   Constitutional: Negative.    HENT: Negative.     Eyes: Negative.    Respiratory: Negative.     Cardiovascular: Negative.    Gastrointestinal: Negative.    Endocrine: Negative.    Genitourinary: Negative.    Musculoskeletal:         Foot pain    Skin: Negative.    Allergic/Immunologic: Negative.    Neurological: Negative.    Hematological: Negative.    Psychiatric/Behavioral: Negative.         OBJECTIVE    /70   Pulse 116   Ht 172.7 cm (68\")   Wt 103 kg (226 lb)   LMP 11/22/2006 (Approximate) Comment: menopause  SpO2 94%   BMI 34.36 kg/m²     Body mass index is 34.36 kg/m².        Physical Exam  Vitals reviewed.   Constitutional:       Appearance: Normal appearance. She is well-developed.   HENT:      Head: Normocephalic and atraumatic.   Neck:      Trachea: Trachea and phonation normal.   Cardiovascular:      Pulses:           Dorsalis pedis pulses are 1+ on the right side and 1+ on the left side.        Posterior tibial pulses are 1+ on the right side and 1+ on the left side.   Pulmonary:      Effort: Pulmonary effort is normal. No respiratory distress.   Abdominal:      General: There is no distension.      Palpations: Abdomen is soft.   Feet:      Right foot:      Skin integrity: Skin integrity normal.      Toenail Condition: Right toenails are abnormally thick and long. Fungal disease present.     Left foot:      Skin integrity: Skin integrity normal.      Toenail Condition: Left toenails are abnormally thick and long. Fungal disease present.  Skin:     General: Skin is warm and dry.   Neurological:      Mental Status: She is alert and oriented to person, place, and time.      GCS: GCS eye " subscore is 4. GCS verbal subscore is 5. GCS motor subscore is 6.   Psychiatric:         Speech: Speech normal.         Behavior: Behavior normal. Behavior is cooperative.         Thought Content: Thought content normal.         Judgment: Judgment normal.              Procedures        ASSESSMENT AND PLAN    Diagnoses and all orders for this visit:    1. Diabetic foot (Primary)  -     Footwear Diabetic    2. Bilateral foot pain    3. Polyneuropathic pain    4. Onychomycosis      Body mass index is 34.36 kg/m².    Recommend follow-up with primary care to discuss BMI greater than 30    - A diabetic foot screening exam was performed and the patient was educated on the foot complications related to diabetes,  preventative foot care and what signs and symptoms to watch for.  Instructed to contact our office if any foot problems develop before next visit.  -Discussed treatments for  painful toenails. Treatment options discussed included nail removal versus debridement. Patient elected for routine nail debridement. An ABN has been signed by the patient.  - Toenails 1-5 bilateral were debrided in length and thickness with nail nipper and electric  to decrease fungal load and risk of infection.  - All the patients questions were answered.  - RTC 3 months or sooner if needed.           This document has been electronically signed by John CHARLES, FNP-C, ONP-C on September 18, 2023 10:06 CDT

## 2025-02-18 ENCOUNTER — OFFICE VISIT (OUTPATIENT)
Dept: INTERNAL MEDICINE | Age: 70
End: 2025-02-18

## 2025-02-18 VITALS
BODY MASS INDEX: 34.4 KG/M2 | HEIGHT: 68 IN | TEMPERATURE: 97.4 F | SYSTOLIC BLOOD PRESSURE: 100 MMHG | WEIGHT: 227 LBS | OXYGEN SATURATION: 97 % | HEART RATE: 72 BPM | DIASTOLIC BLOOD PRESSURE: 68 MMHG

## 2025-02-18 DIAGNOSIS — K21.9 GASTROESOPHAGEAL REFLUX DISEASE WITHOUT ESOPHAGITIS: ICD-10-CM

## 2025-02-18 DIAGNOSIS — H35.30 MACULAR DEGENERATION OF LEFT EYE, UNSPECIFIED TYPE: ICD-10-CM

## 2025-02-18 DIAGNOSIS — E78.5 HYPERLIPIDEMIA, UNSPECIFIED HYPERLIPIDEMIA TYPE: ICD-10-CM

## 2025-02-18 DIAGNOSIS — E11.65 UNCONTROLLED TYPE 2 DIABETES MELLITUS WITH HYPERGLYCEMIA (HCC): ICD-10-CM

## 2025-02-18 DIAGNOSIS — E55.9 VITAMIN D DEFICIENCY: ICD-10-CM

## 2025-02-18 DIAGNOSIS — E11.9 TYPE 2 DIABETES MELLITUS WITHOUT COMPLICATION, WITH LONG-TERM CURRENT USE OF INSULIN (HCC): Primary | ICD-10-CM

## 2025-02-18 DIAGNOSIS — Z79.4 TYPE 2 DIABETES MELLITUS WITHOUT COMPLICATION, WITH LONG-TERM CURRENT USE OF INSULIN (HCC): Primary | ICD-10-CM

## 2025-02-18 RX ORDER — FLUOROMETHOLONE 1 MG/ML
1 SUSPENSION/ DROPS OPHTHALMIC
COMMUNITY
Start: 2024-10-22

## 2025-02-18 RX ORDER — INSULIN GLARGINE 100 [IU]/ML
INJECTION, SOLUTION SUBCUTANEOUS
COMMUNITY

## 2025-02-18 RX ORDER — ANTIOX #8/OM3/DHA/EPA/LUT/ZEAX 250-2.5 MG
1 CAPSULE ORAL 2 TIMES DAILY
COMMUNITY

## 2025-02-18 RX ORDER — LEVOCETIRIZINE DIHYDROCHLORIDE 5 MG/1
5 TABLET, FILM COATED ORAL DAILY
COMMUNITY

## 2025-02-18 RX ORDER — CHOLECALCIFEROL (VITAMIN D3) 1250 MCG
50000 CAPSULE ORAL WEEKLY
COMMUNITY

## 2025-02-18 RX ORDER — FLUTICASONE PROPIONATE 50 MCG
1 SPRAY, SUSPENSION (ML) NASAL DAILY PRN
COMMUNITY

## 2025-02-18 RX ORDER — MECLIZINE HCL 25MG 25 MG/1
TABLET, CHEWABLE ORAL
COMMUNITY
Start: 2024-11-15

## 2025-02-18 RX ORDER — OMEPRAZOLE 20 MG/1
CAPSULE, DELAYED RELEASE ORAL DAILY
COMMUNITY
Start: 2025-01-25

## 2025-02-18 RX ORDER — DOFETILIDE 0.25 MG/1
250 CAPSULE ORAL 2 TIMES DAILY
COMMUNITY

## 2025-02-18 RX ORDER — ROSUVASTATIN CALCIUM 10 MG/1
1 TABLET, COATED ORAL DAILY
COMMUNITY
Start: 2021-10-26 | End: 2025-07-20

## 2025-02-18 RX ORDER — BLOOD SUGAR DIAGNOSTIC
1 STRIP MISCELLANEOUS 3 TIMES DAILY
COMMUNITY
Start: 2025-01-22

## 2025-02-18 RX ORDER — BLOOD-GLUCOSE METER
EACH MISCELLANEOUS
COMMUNITY
Start: 2025-01-22

## 2025-02-18 RX ORDER — PEN NEEDLE, DIABETIC 32GX 5/32"
NEEDLE, DISPOSABLE MISCELLANEOUS
COMMUNITY
Start: 2024-12-09

## 2025-02-18 RX ORDER — LANCETS
EACH MISCELLANEOUS
COMMUNITY
Start: 2025-01-22

## 2025-02-18 RX ORDER — METOPROLOL TARTRATE 25 MG/1
25 TABLET, FILM COATED ORAL 2 TIMES DAILY
COMMUNITY
Start: 2025-01-02

## 2025-02-18 SDOH — ECONOMIC STABILITY: FOOD INSECURITY: WITHIN THE PAST 12 MONTHS, THE FOOD YOU BOUGHT JUST DIDN'T LAST AND YOU DIDN'T HAVE MONEY TO GET MORE.: PATIENT DECLINED

## 2025-02-18 SDOH — ECONOMIC STABILITY: FOOD INSECURITY: WITHIN THE PAST 12 MONTHS, YOU WORRIED THAT YOUR FOOD WOULD RUN OUT BEFORE YOU GOT MONEY TO BUY MORE.: PATIENT DECLINED

## 2025-02-18 ASSESSMENT — PATIENT HEALTH QUESTIONNAIRE - PHQ9
2. FEELING DOWN, DEPRESSED OR HOPELESS: NOT AT ALL
SUM OF ALL RESPONSES TO PHQ9 QUESTIONS 1 & 2: 0
SUM OF ALL RESPONSES TO PHQ QUESTIONS 1-9: 0
1. LITTLE INTEREST OR PLEASURE IN DOING THINGS: NOT AT ALL
SUM OF ALL RESPONSES TO PHQ QUESTIONS 1-9: 0

## 2025-02-18 ASSESSMENT — ENCOUNTER SYMPTOMS
ALLERGIC/IMMUNOLOGIC NEGATIVE: 1
EYES NEGATIVE: 1
GASTROINTESTINAL NEGATIVE: 1
RESPIRATORY NEGATIVE: 1

## 2025-02-18 NOTE — PROGRESS NOTES
[facility]  [facility]  [address] [place] [address] DRIVE  [address]  formerly [facility] [id number]  Dept: [phone]  Dept Fax: [phone]  Loc: [phone]    Cheri Ferreira is a 69 y.o. female who presents today for her medical conditions/complaints as noted below.  Cheri Ferreira is c/o of Establish Care and Blood Sugar Problem (Uncrontrolled blood sugars, nothing that was tried previously has worked.)        HPI:   She presents today \"to see a Endocrinologist\".  Her PCP is Mirtha craig in [facility].  She plans to stay with her PCP.  She states her PCP has been trying to get her an appointment with the Endocrinologist Dr. Jordan in [facility] and has been unable to get an appointment.  She states she \"googled and found our office\".   She states she was on Ozempic last Summer and it caused her A-fib to \"go crazy and caused dizziness\". Lantus 44 of morning and 44  at night.   Blood sugar this morning 175.   We do not have her most recent A1C available for review.   She called Dr. Craig's office to have the results faxed to our office.     She sees Cardiologist in [facility] [place].   [facility] for knee surgery.   Dr. Grider in [facility]  HPI   Chief Complaint   Patient presents with    Establish Care    Blood Sugar Problem     Uncrontrolled blood sugars, nothing that was tried previously has worked.     No past medical history on file.   No past surgical history on file.        2/18/2025    10:19 AM   Vitals   SYSTOLIC 100   DIASTOLIC 68   Pulse 72   Temp 97.4 °F (36.3 °C)   SpO2 97 %   Weight - Scale 227 lb   Height 5' 8\"   Body Mass Index 34.52 kg/m2       No family history on file.    Social History     Tobacco Use    Smoking status: Never    Smokeless tobacco: Never   Substance Use Topics    Alcohol use: Never      Current Outpatient Medications on File Prior to Visit   Medication Sig Dispense Refill    Cholecalciferol (VITAMIN D3) 1.25 MG (03021 UT) CAPS Take 1 capsule by mouth once a